# Patient Record
Sex: MALE | Race: BLACK OR AFRICAN AMERICAN | NOT HISPANIC OR LATINO | Employment: UNEMPLOYED | URBAN - METROPOLITAN AREA
[De-identification: names, ages, dates, MRNs, and addresses within clinical notes are randomized per-mention and may not be internally consistent; named-entity substitution may affect disease eponyms.]

---

## 2023-03-07 LAB
ALANINE AMINOTRANSFERASE (SGPT) (U/L) IN SER/PLAS: 10 U/L (ref 10–52)
ALBUMIN (G/DL) IN SER/PLAS: 4.3 G/DL (ref 3.4–5)
ALBUMIN (MG/L) IN URINE: <7 MG/L
ALBUMIN/CREATININE (UG/MG) IN URINE: NORMAL UG/MG CRT (ref 0–30)
ALKALINE PHOSPHATASE (U/L) IN SER/PLAS: 89 U/L (ref 33–120)
ANION GAP IN SER/PLAS: 13 MMOL/L (ref 10–20)
ASPARTATE AMINOTRANSFERASE (SGOT) (U/L) IN SER/PLAS: 11 U/L (ref 9–39)
BILIRUBIN TOTAL (MG/DL) IN SER/PLAS: 0.4 MG/DL (ref 0–1.2)
CALCIUM (MG/DL) IN SER/PLAS: 9.8 MG/DL (ref 8.6–10.6)
CARBON DIOXIDE, TOTAL (MMOL/L) IN SER/PLAS: 30 MMOL/L (ref 21–32)
CHLORIDE (MMOL/L) IN SER/PLAS: 102 MMOL/L (ref 98–107)
CHOLESTEROL (MG/DL) IN SER/PLAS: 146 MG/DL (ref 0–199)
CHOLESTEROL IN HDL (MG/DL) IN SER/PLAS: 44.9 MG/DL
CHOLESTEROL/HDL RATIO: 3.3
CREATININE (MG/DL) IN SER/PLAS: 0.82 MG/DL (ref 0.5–1.3)
CREATININE (MG/DL) IN URINE: 46.3 MG/DL (ref 20–370)
ESTIMATED AVERAGE GLUCOSE FOR HBA1C: 246 MG/DL
GFR MALE: >90 ML/MIN/1.73M2
GLUCOSE (MG/DL) IN SER/PLAS: 266 MG/DL (ref 74–99)
HEMOGLOBIN A1C/HEMOGLOBIN TOTAL IN BLOOD: 10.2 %
LDL: 84 MG/DL (ref 0–119)
NON HDL CHOLESTEROL: 101 MG/DL (ref 0–149)
POTASSIUM (MMOL/L) IN SER/PLAS: 4.9 MMOL/L (ref 3.5–5.3)
PROTEIN TOTAL: 6.9 G/DL (ref 6.4–8.2)
SODIUM (MMOL/L) IN SER/PLAS: 140 MMOL/L (ref 136–145)
THYROTROPIN (MIU/L) IN SER/PLAS BY DETECTION LIMIT <= 0.05 MIU/L: 0.91 MIU/L (ref 0.44–3.98)
TRIGLYCERIDE (MG/DL) IN SER/PLAS: 87 MG/DL (ref 0–149)
UREA NITROGEN (MG/DL) IN SER/PLAS: 10 MG/DL (ref 6–23)
VALPROIC ACID (UG/ML) IN SER/PLAS: 98 UG/ML (ref 50–100)
VLDL: 17 MG/DL (ref 0–40)

## 2024-12-03 ENCOUNTER — TELEMEDICINE (OUTPATIENT)
Dept: NEUROLOGY | Facility: CLINIC | Age: 24
End: 2024-12-03
Payer: COMMERCIAL

## 2024-12-03 DIAGNOSIS — G40.309 GENERALIZED EPILEPSY (MULTI): Primary | ICD-10-CM

## 2024-12-03 PROCEDURE — 99214 OFFICE O/P EST MOD 30 MIN: CPT | Mod: GT | Performed by: NURSE PRACTITIONER

## 2024-12-03 PROCEDURE — 99214 OFFICE O/P EST MOD 30 MIN: CPT | Performed by: NURSE PRACTITIONER

## 2024-12-03 RX ORDER — DIVALPROEX SODIUM 500 MG/1
1500 TABLET, FILM COATED, EXTENDED RELEASE ORAL DAILY
Qty: 270 TABLET | Refills: 3 | Status: SHIPPED | OUTPATIENT
Start: 2024-12-03 | End: 2025-12-03

## 2024-12-03 RX ORDER — DIVALPROEX SODIUM 500 MG/1
TABLET, FILM COATED, EXTENDED RELEASE ORAL
COMMUNITY
Start: 2014-10-02 | End: 2024-12-03 | Stop reason: SDUPTHER

## 2024-12-03 NOTE — PROGRESS NOTES
[facility]   Epilepsy    Virtual or Telephone Consent    A telephone visit (audio only) between the patient (at the originating site) and the provider (at the distant site) was utilized to provide this telehealth service.   Verbal consent was requested and obtained from Gus Patterson on this date, 12/03/24 for a telehealth visit.      Patient ID: Gus Patterson 24 y.o.male presenting in follow-up for previously diagnosed epilepsy  Patient of: Dr. Kam De Leon    HPI  -----------Classification-----------  EPILEPTIC Paroxysmal Episodes   Semiology:   1. Generalized myoclonic* > GTC seizure**  - Onset: age 11 years  - Frequency: *weekly,** twice monthly   - Dx Signs: N/A  - Lat Signs: N/A  - Hx of SE: N/A  - Triggers: lack of sleep  Handness: R  EZ: likely generalized   Etiology: unknown   Comorbidities: DM Type I     Prior AEDs: N/A  Current AEDs: Valproic acid ER 1500 mg daily     Hx from chart review:  At age 11 years he had his first convulsive episode, he does not recall the shaking part however he says in most of the times they are preceded by generalized jerking movements and loss of hand coordination with dropping objects few seconds prior to the seizure. He sometimes has these jerking movements and he tries to calm down and sleep so they won't progress to a seizure. He does say that lack of sleep and missing his dose are the main contributing factors to his seizures. His convulsion lasts typically 3-5 minutes.      Along with these seizures, he and his mother say that he has hypoglycemic seizures prior to which he feels the need to have something to eat and then start jerking leading to convulsions similar to his regular seizures. The difference per mother, is going to sleep for several hours after the non-provoked seizures while he gets confused for few hours after his hypoglycemic seizures.      He has been taking Depakote, but he forgets taking the medication frequently. For the reason his  Depakote has been prescribed as daily dose. Of note, his sleep schedule has been very disrupted. He will often fall asleep at 0300h, and get up a few hours later (0800h). This is inconsistent, but usually late. He has tried a small amount of melatonin (3mg) without relief.    PRESENT CONCERNS:  Gus is doing well today. Last seizure was in 2024 which he thinks is related to his diabetes. He has been taking his depakote but it is  so he has been having infrequent myoclonic jerks. When taking his meds consistently he has no jerks or seizures. He was recently incarcerated so he is getting back in with his doctors for follow ups. No new concerns       Review of Systems  All other systems reviewed and negative unless otherwise stated above    CONTROLLED SUBSTANCE  N/a    Vitals:  There were no vitals filed for this visit.    PHYSICAL EXAM:  This examination was performed over telehealth by telephone discussion--Patient is alert and oriented. Speech is fluid, without dysarthria. Able to appropriately give information about past history and current events. Further objective neurological testing not possible given nature of phone interview.        ASSESSMENT & PLAN:   24 y.o. male presenting in follow-up for previously diagnosed epilepsy    Problem List Items Addressed This Visit    None  Visit Diagnoses       Generalized epilepsy (Multi)    -  Primary    Relevant Medications    divalproex (Depakote ER) 500 mg 24 hr tablet          Gus likely richards generalized epilepsy who is well controlled when he has his medications. He is taking depakote 1500mg daily with no side effects. Last seizure in 2024.     Continue depakote ER 1500mg daily  RTC 6 months   because you have not lost awareness you are not under restrictions at this time  Call me with any seizures prior to your next appointment   Given my contact information/instructions for Hannah

## 2024-12-03 NOTE — PATIENT INSTRUCTIONS
"Thank you for coming to the Epilepsy Clinic today.    -If you have any sudden new, concerning or worsening symptoms, call 911 and go to the Emergency Room. Otherwise, it was good seeing you today-  -  Your seizures are well controlled on the current medication. Additional prescription refills was sent to the pharmacy.    As we discussed, because you have not had any events or seizures where you lose awareness or control of your actions you have no restrictions at this time. However, if you have an event of seizure where this were to occur, you must inform our office, and we will reassess things. In this event, you stop driving, using heavy machinery, climbing heights, or engaging in any other activity that would cause harm/death to yourself or others were you to lose awareness/consciousness and have a seizure. These restrictions would need to stay in place until at least 6 months of seizure freedom and clearance your physician.    -HOW TO CONTACT [facility]LE[facility] NURSE PRACTITIONER ([phone]).   Instructed to call in the event of seizure, medication refills, or any questions  *Please allow 24-48 hours for non-urgent responses*.  For emergency concerns, please dial 911 or present to the nearest emergency room.  For concerns after business hours (8am-4:30pm) or on weekends please call [phone]  To call and schedule a follow up appointment please call [phone]  -Paperwork may take up to 3 business days to complete-    Every attempt is made to run on time for your appointment, if you are 15 minutes or later for your appoinement you may be asked to reschedule    -Compliance education: It is important to continue to try and achieve seizure control because of the potential for injury and illness due to seizures. In a very small minority of patients with generalized tonic clonic seizures (\"grand mal\"), breathing or heart function can stop during a seizure and result in demise (sudden unexpected death in " epilepsy or SUDEP). Freedom from seizures prevents this kind of outcome-

## 2024-12-06 ENCOUNTER — APPOINTMENT (OUTPATIENT)
Dept: PRIMARY CARE | Facility: CLINIC | Age: 24
End: 2024-12-06
Payer: COMMERCIAL

## 2025-03-11 ENCOUNTER — APPOINTMENT (OUTPATIENT)
Dept: PRIMARY CARE | Facility: CLINIC | Age: 25
End: 2025-03-11
Payer: COMMERCIAL

## 2025-03-11 VITALS
OXYGEN SATURATION: 98 % | RESPIRATION RATE: 18 BRPM | DIASTOLIC BLOOD PRESSURE: 78 MMHG | SYSTOLIC BLOOD PRESSURE: 120 MMHG | HEART RATE: 88 BPM

## 2025-03-11 DIAGNOSIS — Z00.00 HEALTHCARE MAINTENANCE: ICD-10-CM

## 2025-03-11 DIAGNOSIS — Z76.89 ENCOUNTER TO ESTABLISH CARE WITH NEW DOCTOR: ICD-10-CM

## 2025-03-11 DIAGNOSIS — E10.638: ICD-10-CM

## 2025-03-11 DIAGNOSIS — F90.2 ATTENTION DEFICIT HYPERACTIVITY DISORDER (ADHD), COMBINED TYPE: Primary | ICD-10-CM

## 2025-03-11 PROCEDURE — 3074F SYST BP LT 130 MM HG: CPT

## 2025-03-11 PROCEDURE — 3078F DIAST BP <80 MM HG: CPT

## 2025-03-11 PROCEDURE — 99395 PREV VISIT EST AGE 18-39: CPT

## 2025-03-11 RX ORDER — INSULIN DEGLUDEC 100 U/ML
INJECTION, SOLUTION SUBCUTANEOUS
Qty: 3 ML | Refills: 5 | Status: SHIPPED | OUTPATIENT
Start: 2025-03-11

## 2025-03-11 RX ORDER — BLOOD-GLUCOSE METER
EACH MISCELLANEOUS
COMMUNITY
Start: 2021-03-30 | End: 2025-03-14 | Stop reason: SDUPTHER

## 2025-03-11 RX ORDER — ACETAMINOPHEN 325 MG/1
TABLET ORAL EVERY 4 HOURS PRN
COMMUNITY
Start: 2021-09-21

## 2025-03-11 RX ORDER — INSULIN DEGLUDEC 100 U/ML
INJECTION, SOLUTION SUBCUTANEOUS
COMMUNITY
End: 2025-03-11 | Stop reason: SDUPTHER

## 2025-03-11 RX ORDER — INSULIN LISPRO 100 [IU]/ML
INJECTION, SOLUTION INTRAVENOUS; SUBCUTANEOUS
Qty: 9 ML | Refills: 5 | Status: SHIPPED | OUTPATIENT
Start: 2025-03-11

## 2025-03-11 RX ORDER — INSULIN LISPRO 100 [IU]/ML
INJECTION, SOLUTION INTRAVENOUS; SUBCUTANEOUS
COMMUNITY
Start: 2020-04-09 | End: 2025-03-11 | Stop reason: SDUPTHER

## 2025-03-11 ASSESSMENT — PATIENT HEALTH QUESTIONNAIRE - PHQ9
1. LITTLE INTEREST OR PLEASURE IN DOING THINGS: NOT AT ALL
2. FEELING DOWN, DEPRESSED OR HOPELESS: NOT AT ALL
SUM OF ALL RESPONSES TO PHQ9 QUESTIONS 1 AND 2: 0

## 2025-03-11 ASSESSMENT — ENCOUNTER SYMPTOMS
OCCASIONAL FEELINGS OF UNSTEADINESS: 0
DEPRESSION: 0
LOSS OF SENSATION IN FEET: 0

## 2025-03-11 NOTE — PROGRESS NOTES
Subjective   Patient ID: Gus Patterson is a 24 y.o. male who presents for New Patient Visit.    HPI   Patient is a 24-year-old male with past medical history of type 1 diabetes, previously diagnosed seizure on Depakote came to the office to establish care, follow-up ER visit and physical. Not seen any doctor since October after incarceration.  Patient had not ER visit on 3/10/2025 for abdominal pain nausea and vomiting.  Initial lactate was 5.6 trended down to 3.9, beta hydroxybutyrate acid elevated with 0.9 and pH was 7.42 . he was treated with IV fluid, Haldol and Toradol with complete resolution of symptoms.  Please check the ER note for detail.  Non compliant with insulin, diagnosed with type 1 at age 7 yrs. Was admitted for DKA plenty of times. Last A1c was 10.2 in 03/2023. Will see an endocrinologist on 03/31/25. Currently on Treshiba 10 units daily and Sliding scale. He is also has diagnosed Seizure (GMS) since 10 yrs of age.  Well-controlled with Depakote, follows with neurology.  pt was also expressing concern for ADHD,asking for a referral.          Review of Systems   Musculoskeletal:         Muscle wasting and wt eric   All other systems reviewed and are negative.      Objective   /78 (BP Location: Left arm, Patient Position: Sitting)   Pulse 88   Resp 18   SpO2 98%     Physical Exam  Constitutional:       Comments: Thin appearence   HENT:      Head: Normocephalic.      Right Ear: Tympanic membrane, ear canal and external ear normal.      Left Ear: Tympanic membrane, ear canal and external ear normal.      Nose: Nose normal.      Mouth/Throat:      Mouth: Mucous membranes are moist.   Eyes:      Extraocular Movements: Extraocular movements intact.      Conjunctiva/sclera: Conjunctivae normal.   Cardiovascular:      Rate and Rhythm: Normal rate and regular rhythm.      Pulses: Normal pulses.      Heart sounds: Normal heart sounds. No murmur heard.     No friction rub. No gallop.   Pulmonary:       Effort: Pulmonary effort is normal.      Breath sounds: Normal breath sounds. No wheezing or rales.   Abdominal:      General: Abdomen is flat. Bowel sounds are normal. There is no distension.      Palpations: Abdomen is soft.      Tenderness: There is no abdominal tenderness. There is no guarding or rebound.   Musculoskeletal:      Cervical back: Neck supple.      Right lower leg: No edema.      Left lower leg: No edema.   Neurological:      Mental Status: He is alert and oriented to person, place, and time.   Psychiatric:         Mood and Affect: Mood normal.         Behavior: Behavior normal.      Comments: A little and attentive, busy with from all the time, at some point was listening music loudly         Assessment/Plan   Assessment & Plan  Type 1 diabetes mellitus with other oral complication  -  , A1c today 11.7  -Patient was advised to go to the ER if experience any worsening symptoms like nausea, vomiting, abdominal pain.  -Will see him in 2 weeks  -Also suggested to check blood sugar, keep a log and bring it with him in next visit.  Orders:    insulin degludec (Tresiba U-100 Insulin) 100 unit/mL injection; Inject 10 units daily    insulin lispro (HumaLOG) 100 unit/mL pen; Per the sliding scale inject 12 units tid    Referral to Nutrition Services; Future    Referral to Clinical Pharmacy; Future    Referral to Ophthalmology; Future    Attention deficit hyperactivity disorder (ADHD), combined type    Orders:    Referral to Psychiatry; Future    Healthcare maintenance    Orders:    Lipid Panel; Future    Hemoglobin A1C; Future    TSH with reflex to Free T4 if abnormal; Future    Vitamin D 25-Hydroxy,Total (for eval of Vitamin D levels); Future    Encounter to establish care with new doctor  -Previous chart and labs reviewed  -Ordered lab today  -Physical done today, repeat physical in 1 year  -Follow-up in 2 weeks

## 2025-03-11 NOTE — ASSESSMENT & PLAN NOTE
-  , A1c today 11.7  -Patient was advised to go to the ER if experience any worsening symptoms like nausea, vomiting, abdominal pain.  -Will see him in 2 weeks  -Also suggested to check blood sugar, keep a log and bring it with him in next visit.  Orders:    insulin degludec (Tresiba U-100 Insulin) 100 unit/mL injection; Inject 10 units daily    insulin lispro (HumaLOG) 100 unit/mL pen; Per the sliding scale inject 12 units tid    Referral to Nutrition Services; Future    Referral to Clinical Pharmacy; Future    Referral to Ophthalmology; Future

## 2025-03-14 ENCOUNTER — TELEMEDICINE (OUTPATIENT)
Dept: PHARMACY | Facility: HOSPITAL | Age: 25
End: 2025-03-14
Payer: COMMERCIAL

## 2025-03-14 DIAGNOSIS — E10.638: Primary | ICD-10-CM

## 2025-03-14 RX ORDER — BLOOD-GLUCOSE METER
1 EACH MISCELLANEOUS 4 TIMES DAILY
Qty: 100 STRIP | Refills: 11 | Status: SHIPPED | OUTPATIENT
Start: 2025-03-14 | End: 2026-03-14

## 2025-03-14 RX ORDER — GLUCAGON INJECTION, SOLUTION 1 MG/.2ML
1 INJECTION, SOLUTION SUBCUTANEOUS AS NEEDED
Qty: 4 ML | Refills: 11 | Status: SHIPPED | OUTPATIENT
Start: 2025-03-14

## 2025-03-14 RX ORDER — BLOOD-GLUCOSE SENSOR
EACH MISCELLANEOUS
Qty: 9 EACH | Refills: 3 | Status: SHIPPED | OUTPATIENT
Start: 2025-03-14

## 2025-03-14 NOTE — PROGRESS NOTES
Clinical Pharmacy Appointment    Patient ID: Gus Patterson is a 24 y.o. male who presents for Diabetes.    Pt is here for First appointment.   Referring Provider: Shanae Zambrano MD  PCP: Shanae Zambrano MD, last visit: 3/11/25, next visit: unknown    Subjective   HPI  PMH significant for Type 1 Diabetes.    Medication System Management  Patients preferred pharmacy:   [id number] - [place], [place] - [address] [address]  [address] [address] [address]  Phone: [phone] Fax: [phone]    Adherence/Organization: No current concerns  Affordability/Accessibility: No current concerns  Adverse Effects: No current concerns    Drug Interactions  No relevant drug interactions were noted.    DIABETES MELLITUS Type 1:    Follows with Endocrinology?: Yes, not with  up to this point . However, does have appointment with endocrinology scheduled for 3/31/25    Pertinent PMH Review  Known diabetic complications:   Has gastroparesis    Pharmacological Therapy  Previous Medications: none outside of current     Glucose Readings  Glucometer/CGM Type: glucometer  Current home BG readings: has not tested   Any episodes of hypoglycemia? Yes- he states he felt low  Did patient treat episode of hypoglycemia appropriately? Yes, treated  Does the patient have a prescription for ready-to-use Glucagon? No, sent today    Lifestyle  Diet: has not had an appetite. Thinking about a liquis diet because he doesn't like food. He states he doesn't even want to eat-this has been going on for 2 months  Snacks: chips, not eating  Physical Activity: he moves around all day everyday. He is moving at work and doesn't like to sit down    Risk Reducing Medications  Statin? No- age< 40  ACE-I/ARB? No    Preventative Care  Diabetic Eye Exam: Needs completed, last unknown  Monofilament Foot Exam: Needs completed, last unknown  UACR: Needs completed, last unknown  Immunizations Needed: Annual Flu, Prevnar, Tdap, and Hepatitis B  Series  Tobacco Use: smoker  (some days)    Objective   No Known Allergies  Social History     Social History Narrative    Not on file      Medication Review  Current Outpatient Medications   Medication Instructions    acetaminophen (Tylenol) 325 mg tablet Every 4 hours PRN    blood sugar diagnostic (OneTouch Verio test strips) strip 1 each, subcutaneous, 4 times daily    Dexcom G7 Sensor device Place 1 sensor as directed on the back of the arm every 10 days    divalproex (DEPAKOTE ER) 1,500 mg, oral, Daily    insulin degludec (Tresiba U-100 Insulin) 100 unit/mL injection Inject 10 units daily    insulin lispro (HumaLOG) 100 unit/mL pen Per the sliding scale inject 12 units tid      Vitals  BP Readings from Last 2 Encounters:   03/11/25 120/78   02/24/23 107/69     BMI Readings from Last 1 Encounters:   02/24/23 17.97 kg/m²      Labs  A1C  Lab Results   Component Value Date    HGBA1C 10.2 (A) 03/07/2023    HGBA1C 9.2 (H) 04/11/2022    HGBA1C 10.5 (H) 10/17/2021     BMP  Lab Results   Component Value Date    CALCIUM 9.8 03/07/2023     03/07/2023    K 4.9 03/07/2023    CO2 30 03/07/2023     03/07/2023    BUN 10 03/07/2023    CREATININE 0.82 03/07/2023    EGFR 114 06/28/2021     LFTs  Lab Results   Component Value Date    ALT 10 03/07/2023    AST 11 03/07/2023    ALKPHOS 89 03/07/2023    BILITOT 0.4 03/07/2023     FLP  Lab Results   Component Value Date    TRIG 87 03/07/2023    CHOL 146 03/07/2023    LDLF 84 03/07/2023    HDL 44.9 03/07/2023     Urine Microalbumin  Lab Results   Component Value Date    MICROALBCREA SEE COMMENT 03/07/2023     Weight Management  Wt Readings from Last 3 Encounters:   02/24/23 49 kg (108 lb)   02/16/23 48.5 kg (107 lb)   05/26/22 53.5 kg (118 lb)      There is no height or weight on file to calculate BMI.    Assessment/Plan   Problem List Items Addressed This Visit          Endocrine/Metabolic    Type 1 diabetes mellitus with oral complication (Multi) - Primary    Relevant  Medications    blood sugar diagnostic (OneTouch Verio test strips) strip    Dexcom G7 Sensor device       Patient's A1c is 10.2% on 3/7/23. Goal A1c <7%.  Next A1c due currently  Will:  Continue Tresiba 10 units once daily  Continue Humalog per carbohydrate count and sliding scale  States he does 1 units every 15 carbs  Also looks at the blood sugar and does a sliding scale, but could not recall what it was  Ordering Dexcom G7. Patient utilized this in th epast and I feel it would be massively helpful  Ordered glucagon for severe hypoglycemia risk  Not changing his dosing today as I have no blood sugars to go off of and no idea what his sliding scale is. He likely needs major insulin adjustments    Patient Education:  Counseled patient on relevant medication mechanisms of action, expectations, side effects, duration of therapy, contraindications, administration, and monitoring parameters.  All questions and concerns addressed. Contact pharmacist with any further questions or concerns prior to next appointment.  Reviewed dietary recommendations: Healthy Plate method  Reviewed BG goals: Fasting BG goal , Postprandial BG goal <180 mg/dL, A1C goal <7%    Clinical Pharmacist follow-up: not needed from primary care pharmacy team. Patient will start meeting with endocrinology through  on 3/31/25  Patient is not followed in [facility].    Thank you,  Adarsh SearsD, [facility]  Clinical Pharmacy Specialist-Primary Care  [phone]    Continue all meds under the continuation of care with the referring provider and clinical pharmacy team.  Verbal consent to manage patient's drug therapy was obtained from the patient. They were informed they may decline to participate or withdraw from participation in pharmacy services at any time.

## 2025-03-19 ENCOUNTER — PATIENT OUTREACH (OUTPATIENT)
Dept: CARE COORDINATION | Facility: CLINIC | Age: 25
End: 2025-03-19
Payer: COMMERCIAL

## 2025-03-27 ENCOUNTER — PATIENT OUTREACH (OUTPATIENT)
Dept: CARE COORDINATION | Facility: CLINIC | Age: 25
End: 2025-03-27
Payer: COMMERCIAL

## 2025-03-31 ENCOUNTER — APPOINTMENT (OUTPATIENT)
Dept: ENDOCRINOLOGY | Facility: CLINIC | Age: 25
End: 2025-03-31
Payer: COMMERCIAL

## 2025-03-31 VITALS
HEIGHT: 67 IN | HEART RATE: 61 BPM | DIASTOLIC BLOOD PRESSURE: 77 MMHG | BODY MASS INDEX: 18.21 KG/M2 | SYSTOLIC BLOOD PRESSURE: 121 MMHG | WEIGHT: 116 LBS

## 2025-03-31 DIAGNOSIS — E11.43 DIABETIC GASTROPARESIS (MULTI): ICD-10-CM

## 2025-03-31 DIAGNOSIS — E10.65 TYPE 1 DIABETES MELLITUS WITH HYPERGLYCEMIA (MULTI): Primary | ICD-10-CM

## 2025-03-31 DIAGNOSIS — K31.84 DIABETIC GASTROPARESIS (MULTI): ICD-10-CM

## 2025-03-31 LAB — POC HEMOGLOBIN A1C: 11.3 % (ref 4.2–6.5)

## 2025-03-31 PROCEDURE — 3078F DIAST BP <80 MM HG: CPT | Performed by: STUDENT IN AN ORGANIZED HEALTH CARE EDUCATION/TRAINING PROGRAM

## 2025-03-31 PROCEDURE — 3074F SYST BP LT 130 MM HG: CPT | Performed by: STUDENT IN AN ORGANIZED HEALTH CARE EDUCATION/TRAINING PROGRAM

## 2025-03-31 PROCEDURE — 99204 OFFICE O/P NEW MOD 45 MIN: CPT | Performed by: STUDENT IN AN ORGANIZED HEALTH CARE EDUCATION/TRAINING PROGRAM

## 2025-03-31 PROCEDURE — 83036 HEMOGLOBIN GLYCOSYLATED A1C: CPT | Performed by: STUDENT IN AN ORGANIZED HEALTH CARE EDUCATION/TRAINING PROGRAM

## 2025-03-31 PROCEDURE — 3008F BODY MASS INDEX DOCD: CPT | Performed by: STUDENT IN AN ORGANIZED HEALTH CARE EDUCATION/TRAINING PROGRAM

## 2025-03-31 RX ORDER — BLOOD-GLUCOSE SENSOR
EACH MISCELLANEOUS
Qty: 9 EACH | Refills: 3 | Status: SHIPPED | OUTPATIENT
Start: 2025-03-31

## 2025-03-31 RX ORDER — PEN NEEDLE, DIABETIC 30 GX3/16"
NEEDLE, DISPOSABLE MISCELLANEOUS
Qty: 200 EACH | Refills: 3 | Status: SHIPPED | OUTPATIENT
Start: 2025-03-31

## 2025-03-31 RX ORDER — INSULIN LISPRO 100 [IU]/ML
INJECTION, SOLUTION INTRAVENOUS; SUBCUTANEOUS
Qty: 15 ML | Refills: 3 | Status: SHIPPED | OUTPATIENT
Start: 2025-03-31

## 2025-03-31 RX ORDER — INSULIN DEGLUDEC 100 U/ML
INJECTION, SOLUTION SUBCUTANEOUS
Qty: 15 ML | Refills: 5 | Status: SHIPPED | OUTPATIENT
Start: 2025-03-31

## 2025-03-31 ASSESSMENT — PAIN SCALES - GENERAL: PAINLEVEL_OUTOF10: 0-NO PAIN

## 2025-03-31 NOTE — PROGRESS NOTES
"24 M: DM1, epilepsy, polycythemia, gastroparesis    [place]  Was [place] for about 1.5 years so was not following with and Endo for awhile   Was getting standard prandial doses     Diabetes History     DM diagnosed 6 yo   Complications Micro and Macro-gastroparesis dx with ANJELICA in   A1c:   Lab Results   Component Value Date    HGBA1C 11.3 (A) 2025       Regimen   Tresiba 10   Doing prandial 4-5 units standard    But previously on a carb ratio    SMBG   Dexcom G7     Hypoglycemia none    Diet: none restrictive, but eating less to prevent GI symptoms     Comorbidities and Screening  Eye Exam: needs  Foot exam: needs    Lipid  Lab Results   Component Value Date    CHOL 146 2023    CHOL 138 2019    CHOL 128 2018     Lab Results   Component Value Date    HDL 44.9 2023    HDL 36.5 (A) 2019    HDL 36.5 (A) 2018     No results found for: \"LDLCALC\"  Lab Results   Component Value Date    TRIG 87 2023     No components found for: \"CHOLHDL\"      Statin- none  Cr and albuminuria- no known CKD   Lab Results   Component Value Date    CREATININE 0.82 2023    EGFR 114 2021      ACE/ARB- none     Past Medical History:   Diagnosis Date    Attention and concentration deficit     Attention disturbance    Attention-deficit hyperactivity disorder, unspecified type     Hyperactive    Epilepsy, unspecified, not intractable, without status epilepticus     Epilepsy    Other conditions influencing health status     Behavior problems    Other conduct disorders     Temper tantrums    Other specified behavioral and emotional disorders with onset usually occurring in childhood and adolescence     Nail biting    Other symptoms and signs involving appearance and behavior     Aggressive behavior    Parent-biological child conflict     Parental concern regarding discipline    Personal history of other (corrected) conditions arising in the  " period     History of  jaundice    Personal history of other endocrine, nutritional and metabolic disease     History of type 1 diabetes mellitus    Personal history of other mental and behavioral disorders     History of attention deficit hyperactivity disorder (ADHD)    Short stature (child) 2016    Decreased growth velocity, height    Sleep disorder, unspecified     Sleep disturbance    Type 2 diabetes mellitus with hypoglycemia without coma 10/23/2018    Severe diabetic hypoglycemia     Family History   Family history unknown: Yes      Social History     Socioeconomic History    Marital status: Single     Spouse name: Not on file    Number of children: Not on file    Years of education: Not on file    Highest education level: Not on file   Occupational History    Not on file   Tobacco Use    Smoking status: Some Days     Types: Cigarettes    Smokeless tobacco: Not on file   Substance and Sexual Activity    Alcohol use: Not Currently    Drug use: Not Currently    Sexual activity: Yes     Partners: Female   Other Topics Concern    Not on file   Social History Narrative    Not on file     Social Drivers of Health     Financial Resource Strain: Low Risk  (2020)    Received from [facility]    Overall Financial Resource Strain (CARDIA)     Difficulty of Paying Living Expenses: Not very hard   Food Insecurity: No Food Insecurity (2020)    Received from [facility]    Hunger Vital Sign     Worried About Running Out of Food in the Last Year: Never true     Ran Out of Food in the Last Year: Never true   Transportation Needs: No Transportation Needs (2020)    Received from [facility]    PRAPARE - Transportation     Lack of Transportation (Medical): No     Lack of Transportation (Non-Medical): No   Physical Activity: Not on file   Stress: Not on file   Social Connections: Not on file   Intimate Partner Violence: Not on file   Housing Stability: Not on file        ROS:  Negative  "except those noted in current and interim history    Physical Exam  Cardiovascular:      Rate and Rhythm: Normal rate and regular rhythm.   Skin:     General: Skin is warm.      Comments: No lipodystrophy   Neurological:      Mental Status: He is alert.          labs and imaging reviewed, pertinent findings listed on HPI and Impression      Problem List Items Addressed This Visit       Type 1 diabetes mellitus with hyperglycemia (Multi) - Primary    Relevant Medications    Dexcom G7 Sensor device    insulin degludec (Tresiba U-100 Insulin) 100 unit/mL injection    insulin lispro (HumaLOG) 100 unit/mL pen    pen needle, diabetic (BD Ultra-Fine Alyssa Pen Needle) 32 gauge x 5/32\" needle    Other Relevant Orders    Tissue Transglutaminase IgA    POCT glycosylated hemoglobin (Hb A1C) manually resulted (Completed)    Diabetic gastroparesis (Multi)     DM1 with hyperglycemia  Complicated by gastroparesis but currently clinically controlled     He has some issues with missed/late bolusing due to hypoglycemia fear. Current prandial dosing is too strong for carbs thus dropping >100 points    We discussed going on automated pump, specifically iLet since he has some current issues with adequate bolusing     Switch back IC 15   Glargine 10 units daily     Advised regarding eye exam    Has labs pending by PCP, will also add celiac screen     CGM reviewed, minimum of 72 hrs of data reviewed, CGM data reviewed to influence glucose treatment plan       Reporting period: Tue Mar 18, 2025 - Mon Mar 31, 2025  -----------------------------  Glucose Details    Average glucose: 238 mg/dL    GMI: 9.0%    Standard deviation: 91 mg/dL    Coefficient of Variation: 38.1%  -----------------------------  Time in Range    Very High: 41%    High: 29%    In Range: 30%    Low: 0%    Very Low: 0%    Target Range   mg/dL    -----------------------------  Sensor usage    Days with data: 9/14    Time active: 89%    Avg. calibrations per day: " 0.1    Follow up in about 4 months

## 2025-03-31 NOTE — PATIENT INSTRUCTIONS
Follow the insulin instruction     Schedule appointment with eye doctor     The name of the pump is ilet bionic pancreas    [email or web address]    Let me know if you would like to start this     Follow up in 3-4 months    Camilla Mayo MD  Divison of Endocrinology   [facility]   Phone: [phone]    option 4, then option 1  Fax: [phone]

## 2025-04-09 ENCOUNTER — PATIENT OUTREACH (OUTPATIENT)
Dept: CARE COORDINATION | Facility: CLINIC | Age: 25
End: 2025-04-09
Payer: COMMERCIAL

## 2025-04-10 ENCOUNTER — APPOINTMENT (OUTPATIENT)
Dept: OPHTHALMOLOGY | Facility: CLINIC | Age: 25
End: 2025-04-10
Payer: COMMERCIAL

## 2025-05-07 ENCOUNTER — TELEPHONE (OUTPATIENT)
Dept: ENDOCRINOLOGY | Facility: CLINIC | Age: 25
End: 2025-05-07
Payer: COMMERCIAL

## 2025-05-20 ENCOUNTER — APPOINTMENT (OUTPATIENT)
Dept: BEHAVIORAL HEALTH | Facility: CLINIC | Age: 25
End: 2025-05-20
Payer: COMMERCIAL

## 2025-05-21 DIAGNOSIS — E10.65 TYPE 1 DIABETES MELLITUS WITH HYPERGLYCEMIA (MULTI): Primary | ICD-10-CM

## 2025-05-21 RX ORDER — INSULIN GLARGINE 100 [IU]/ML
INJECTION, SOLUTION SUBCUTANEOUS
Qty: 15 ML | Refills: 6 | Status: SHIPPED | OUTPATIENT
Start: 2025-05-21

## 2025-08-15 ENCOUNTER — APPOINTMENT (OUTPATIENT)
Dept: BEHAVIORAL HEALTH | Facility: CLINIC | Age: 25
End: 2025-08-15
Payer: COMMERCIAL

## 2025-08-19 ENCOUNTER — APPOINTMENT (OUTPATIENT)
Dept: BEHAVIORAL HEALTH | Facility: CLINIC | Age: 25
End: 2025-08-19
Payer: COMMERCIAL

## 2025-08-19 DIAGNOSIS — R41.840 INATTENTION: ICD-10-CM

## 2025-08-19 PROCEDURE — 3046F HEMOGLOBIN A1C LEVEL >9.0%: CPT | Performed by: REGISTERED NURSE

## 2025-08-19 PROCEDURE — 99205 OFFICE O/P NEW HI 60 MIN: CPT | Performed by: REGISTERED NURSE

## 2025-09-02 DIAGNOSIS — E10.65 TYPE 1 DIABETES MELLITUS WITH HYPERGLYCEMIA (MULTI): Primary | ICD-10-CM

## 2025-09-04 ENCOUNTER — PATIENT OUTREACH (OUTPATIENT)
Dept: CARE COORDINATION | Facility: CLINIC | Age: 25
End: 2025-09-04
Payer: COMMERCIAL

## 2025-09-17 ENCOUNTER — APPOINTMENT (OUTPATIENT)
Dept: BEHAVIORAL HEALTH | Facility: CLINIC | Age: 25
End: 2025-09-17
Payer: COMMERCIAL